# Patient Record
Sex: FEMALE
[De-identification: names, ages, dates, MRNs, and addresses within clinical notes are randomized per-mention and may not be internally consistent; named-entity substitution may affect disease eponyms.]

---

## 2023-06-13 PROBLEM — Z00.00 ENCOUNTER FOR PREVENTIVE HEALTH EXAMINATION: Status: ACTIVE | Noted: 2023-06-13

## 2023-07-26 ENCOUNTER — APPOINTMENT (OUTPATIENT)
Dept: PAIN MANAGEMENT | Facility: CLINIC | Age: 58
End: 2023-07-26
Payer: MEDICAID

## 2023-07-26 VITALS
BODY MASS INDEX: 22.98 KG/M2 | HEART RATE: 79 BPM | DIASTOLIC BLOOD PRESSURE: 71 MMHG | WEIGHT: 114 LBS | HEIGHT: 59 IN | SYSTOLIC BLOOD PRESSURE: 106 MMHG

## 2023-07-26 DIAGNOSIS — Z83.49 FAMILY HISTORY OF OTHER ENDOCRINE, NUTRITIONAL AND METABOLIC DISEASES: ICD-10-CM

## 2023-07-26 DIAGNOSIS — M54.50 LOW BACK PAIN, UNSPECIFIED: ICD-10-CM

## 2023-07-26 PROCEDURE — 99214 OFFICE O/P EST MOD 30 MIN: CPT

## 2023-07-27 PROBLEM — M54.50 LOW BACK PAIN: Status: ACTIVE | Noted: 2023-07-27

## 2023-07-28 NOTE — HISTORY OF PRESENT ILLNESS
[FreeTextEntry1] : ORIGINAL PRESENTATION: Ms. Vieira is a 56 year old woman with a history of chronic lower back pain which began after given birth to her daughter 25 years ago. She is here with her son who aides in translation, with complaints of pain in the located in the lower back radiating into mostly the right lower extremity, with some associated numbness into the right leg. She has trialed physical therapy and acupuncture in the past with moderate relief of her pain.  \par \par The pain started after no inciting event. The patient has had this pain for 5 years worsening over the past 4 years.\par Patient describes pain as moderate, 5/10 currently, 8/10 at its worst, 2/10 at its best. During the last month the pain\par has been intermittent with symptoms worsening evening. Pain described as cramping, dull/aching. Pain is associated with numbness/pins and needles into the back. Patient has weakness in the upper extremities, dropping objects. Pain is increased with lying down, standing, walking. Pain is decreased with lying down, sitting, relaxation, exercise. Pain is not changed with coughing/sneezing, bowel movements. Bowel or bladder habits, patient reports incontinence.\par \par ACTIVITIES: Patient could walk 7-8 blocks before the pain starts. Patient can sit 4-5 hour before pain starts. Patient\par can stand 1 hour before pain starts. The patient sometimes lies down because of pain. Patient uses no assistive device at this time. Patient has difficulty going to work, performing household chores, doing outside work or shopping, participating in recreational activities, exercising at this time.\par \par PRIOR PAIN TREATMENTS: Moderate relief with physical therapy, acupuncture.\par \par PRIOR PAIN MEDICATIONS: None mentioned.\par \par \par PATIENT PRESENTS FOR FOLLOW UP: She was last seen in the office on 5/16/22 history of chronic lower back pain which began after given birth to her daughter 25 years ago. At that time she was s/p 2 RT L4-5 SNRI injections on 3/10/22 and 4/28/22, which initially provided her with 70-80% relief of her symptoms for 6 months. Pain is slowly returning to baseline. She is hesitant to repeat the injection because the pain will return.

## 2023-07-28 NOTE — DATA REVIEWED
[FreeTextEntry1] : MRI lumbar spine dated 11/26/2021: Impression: Degenerative anterior spondylolisthesis at L4-5 going with facet and ligamentum flavum hypertrophy to produce a mild lumbar stenosis. Small central subligamentous disc herniation at L1-2. Small annular bulge L5-S1 without thecal sac compression. EMG/NCV bilateral lower extremities dated 12/14/2021 is within normal limits.\par \par SOAPP: low risk 7/26/23\par Low risk: Patient has combination of a low risk SOAP and no risk factors. UDS would be repeated randomly every quarter. \par \par UDS: No data obtained today.\par \par [place] REGISTRY: Checked.

## 2023-07-28 NOTE — ASSESSMENT
[FreeTextEntry1] : This is a 58 year old female with a history of chronic lower back pain which began after given birth to her daughter 25 years ago. She was last seen in our office on 5/16/23 and underwent two RT L4-5 SNRI injections, on 3/10/22 and 4/28/22, which initially provided her with 70-80% relief of her symptoms for 6 months. Her pain is returning to baseline but she defers moving forward with further injection therapy at this time. Patient will follow up on an as needed basis and can call if she decided to move forward with the injections. She was provided a letter today that gives the dates that she was seen in the office and the date of the last injection. All this patients questions were answered and the conversation was understood well.\par \par KEON, Atiya Wong, attest that this documentation has been prepared under the direction and in the presence of Provider Malathi Funez MD.\par \par \par Thank you for allowing me to assist in the management of this patient. \par \par \par Best Regards, \par \par \par Malathi Funez M.D., [facility]\par \par \par Diplomate, American Board of Physical Medicine and Rehabilitation\par Diplomate, American Board of Pain Medicine \par Diplomate, American Board of Pain Management\par

## 2023-07-28 NOTE — PHYSICAL EXAM
[Normal Coordination] : normal coordination [Normal DTR UE/LE] : normal DTR UE/LE  [Normal Sensation] : normal sensation [Normal Mood and Affect] : normal mood and affect [Orientated] : orientated [Able to Communicate] : able to communicate [Well Developed] : well developed [Well Nourished] : well nourished [] : no swelling